# Patient Record
Sex: MALE | Race: WHITE
[De-identification: names, ages, dates, MRNs, and addresses within clinical notes are randomized per-mention and may not be internally consistent; named-entity substitution may affect disease eponyms.]

---

## 2021-12-28 ENCOUNTER — HOSPITAL ENCOUNTER (OUTPATIENT)
Dept: HOSPITAL 96 - M.PUL | Age: 36
End: 2021-12-28
Attending: PHYSICAL MEDICINE & REHABILITATION
Payer: COMMERCIAL

## 2021-12-28 DIAGNOSIS — J45.998: Primary | ICD-10-CM

## 2021-12-31 NOTE — PF
[address]  [address]                    PULMONARY FUNCTION REPORT     
_______________________________________________________________________________
 
Name:       CAROLINA WATTERS          Room:                      REG CLI 
M..#:  [id number]      Account #:      [id number]  
Admission:  12/28/21     Attend Phys[facility]    [facility]
Discharge:               Date of Birth:  [birth year]  
         Report #: [id number]
                                                                     [id number]
_______________________________________________________________________________
THIS REPORT FOR:  
 
cc:  FAM - Family physician unknown
     FAM - Family physician unknown                                       
     Gt Taylor MD                                                   ~
DATE OF VISIT: 12/28/2021
 
PULMONARY FUNCTION TEST
 
The FEV1/FVC ratio is normal at 74% with an FVC normal at 83%.  The FEV1 is 
decreased to 78%.  The FEF 25/75 is normal at 71%.  After the administration of 
a bronchodilator, there is a 12% increase in FEV1.  There is no significant 
change in any of the other values mentioned here.  The patient's 
post-bronchodilator FEV1 is 4.23 liters.  Only a spirometry was performed.
 
IMPRESSION:  Mild obstruction without reversibility.
 
 
 
 
 
 
 
 
 
 
 
 
 
 
 
 
 
 
 
 
 
 
 
 
 
 
 
 
<ELECTRONICALLY SIGNED>
                                        By:  Gt Taylor MD              
12/31/21     0039
D: 12/30/21 2231_______________________________________
T: 12/30/21 2248Abhumika Taylor MD                 /nt